# Patient Record
Sex: FEMALE | Race: WHITE
[De-identification: names, ages, dates, MRNs, and addresses within clinical notes are randomized per-mention and may not be internally consistent; named-entity substitution may affect disease eponyms.]

---

## 2017-01-24 ENCOUNTER — HOSPITAL ENCOUNTER (OUTPATIENT)
Dept: HOSPITAL 74 - FASU | Age: 67
Discharge: HOME | End: 2017-01-24
Attending: OPHTHALMOLOGY
Payer: COMMERCIAL

## 2017-01-24 VITALS — SYSTOLIC BLOOD PRESSURE: 137 MMHG | HEART RATE: 74 BPM | DIASTOLIC BLOOD PRESSURE: 82 MMHG

## 2017-01-24 VITALS — TEMPERATURE: 98.7 F

## 2017-01-24 VITALS — BODY MASS INDEX: 17.6 KG/M2

## 2017-01-24 DIAGNOSIS — H26.8: Primary | ICD-10-CM

## 2017-01-24 PROCEDURE — 08RJ3JZ REPLACEMENT OF RIGHT LENS WITH SYNTHETIC SUBSTITUTE, PERCUTANEOUS APPROACH: ICD-10-PCS | Performed by: OPHTHALMOLOGY

## 2017-01-24 RX ADMIN — FLURBIPROFEN SODIUM ONE DROP: 0.3 SOLUTION/ DROPS OPHTHALMIC at 09:50

## 2017-01-24 RX ADMIN — FLURBIPROFEN SODIUM ONE DROP: 0.3 SOLUTION/ DROPS OPHTHALMIC at 09:45

## 2017-01-24 RX ADMIN — GENTAMICIN SULFATE ONE DROP: 3 SOLUTION/ DROPS OPHTHALMIC at 09:40

## 2017-01-24 RX ADMIN — PHENYLEPHRINE HYDROCHLORIDE ONE DROP: 0.25 SPRAY NASAL at 09:50

## 2017-01-24 RX ADMIN — FLURBIPROFEN SODIUM ONE DROP: 0.3 SOLUTION/ DROPS OPHTHALMIC at 09:40

## 2017-01-24 RX ADMIN — CYCLOPENTOLATE HYDROCHLORIDE ONE DROP: 10 SOLUTION/ DROPS OPHTHALMIC at 09:50

## 2017-01-24 RX ADMIN — PHENYLEPHRINE HYDROCHLORIDE ONE DROP: 0.25 SPRAY NASAL at 09:55

## 2017-01-24 RX ADMIN — TROPICAMIDE ONE DROP: 10 SOLUTION/ DROPS OPHTHALMIC at 09:55

## 2017-01-24 RX ADMIN — FLURBIPROFEN SODIUM ONE DROP: 0.3 SOLUTION/ DROPS OPHTHALMIC at 10:00

## 2017-01-24 RX ADMIN — TROPICAMIDE ONE DROP: 10 SOLUTION/ DROPS OPHTHALMIC at 09:45

## 2017-01-24 RX ADMIN — GENTAMICIN SULFATE ONE DROP: 3 SOLUTION/ DROPS OPHTHALMIC at 09:50

## 2017-01-24 RX ADMIN — PHENYLEPHRINE HYDROCHLORIDE ONE DROP: 0.25 SPRAY NASAL at 09:40

## 2017-01-24 RX ADMIN — PHENYLEPHRINE HYDROCHLORIDE ONE DROP: 0.25 SPRAY NASAL at 09:45

## 2017-01-24 RX ADMIN — CYCLOPENTOLATE HYDROCHLORIDE ONE DROP: 10 SOLUTION/ DROPS OPHTHALMIC at 10:00

## 2017-01-24 RX ADMIN — TROPICAMIDE ONE DROP: 10 SOLUTION/ DROPS OPHTHALMIC at 09:40

## 2017-01-24 RX ADMIN — CYCLOPENTOLATE HYDROCHLORIDE ONE DROP: 10 SOLUTION/ DROPS OPHTHALMIC at 09:55

## 2017-01-24 RX ADMIN — GENTAMICIN SULFATE ONE DROP: 3 SOLUTION/ DROPS OPHTHALMIC at 09:45

## 2017-01-24 RX ADMIN — FLURBIPROFEN SODIUM ONE DROP: 0.3 SOLUTION/ DROPS OPHTHALMIC at 09:55

## 2017-01-24 RX ADMIN — CYCLOPENTOLATE HYDROCHLORIDE ONE DROP: 10 SOLUTION/ DROPS OPHTHALMIC at 09:45

## 2017-01-24 RX ADMIN — TROPICAMIDE ONE DROP: 10 SOLUTION/ DROPS OPHTHALMIC at 10:00

## 2017-01-24 RX ADMIN — CYCLOPENTOLATE HYDROCHLORIDE ONE DROP: 10 SOLUTION/ DROPS OPHTHALMIC at 09:40

## 2017-01-24 RX ADMIN — TROPICAMIDE ONE DROP: 10 SOLUTION/ DROPS OPHTHALMIC at 09:50

## 2017-01-24 RX ADMIN — PHENYLEPHRINE HYDROCHLORIDE ONE DROP: 0.25 SPRAY NASAL at 10:00

## 2017-01-24 RX ADMIN — GENTAMICIN SULFATE ONE DROP: 3 SOLUTION/ DROPS OPHTHALMIC at 09:55

## 2017-01-24 RX ADMIN — GENTAMICIN SULFATE ONE DROP: 3 SOLUTION/ DROPS OPHTHALMIC at 10:00

## 2017-01-25 NOTE — OP
DATE OF OPERATION:  01/24/2017

 

PREOPERATIVE DIAGNOSIS:   Cataract, right eye.

 

POSTOPERATIVE DIAGNOSIS:   Cataract, right eye.

 

PROCEDURE:   Cataract extraction via phacoemulsification with insertion of posterior

chamber lens implant, right eye. 

 

SURGEON:  Marcellus Dutton MD

 

ASSISTANT:  Luda Eldridge MD

 

ANESTHESIA:  Regional with sedation. 

 

COMPLICATIONS:  None. 

 

ESTIMATED BLOOD LOSS:  Less than 1 mL.

 

SPECIMENS:  None. 

 

PROCEDURE:   The patient was identified in the holding area.  After all risks,

benefits, and alternatives were explained to the patient, informed consent was

obtained.  The right eye was marked with a marking pen.  The patient entered the

operating room on a stretcher.  After a formal timeout was performed, a 3-mL

injection of equal parts of 2% lidocaine with epinephrine and 0.5% Marcaine was given

around the right eye.  The patient was then prepped and draped in the usual sterile

fashion.  A lid speculum was placed beneath the eyelids of the right eye.  A

superotemporal paracentesis incision was created using a 15-degree blade. 

Viscoelastic was then injected into the anterior chamber.  A 2.2-mm keratome blade

was then used to make an inferotemporal incision.  A 360-degree continuous

curvilinear capsulorrhexis was created using a bent cystotome and Utrata forceps. 

Hydrodissection was performed with balanced saline solution on a cannula. 

Phacoemulsification was then introduced to disassemble and remove the nucleus in its

entirety.  Irrigation/aspiration was then used to remove any remaining cortical

material from the eye.  The capsular bag was then refilled using viscoelastic.  Alejo

model [id number] with a power of 19.5 diopters, serial number [id number] was inspected

and found to be defect free and injected into the capsular bag. 

Irrigation/aspiration was then used to remove any remaining viscoelastic from the

eye.  The anterior chamber was then reformed using balanced saline solution. 

Intracaval injections of Miochol and Miostat were then given.  All wounds were

hydrated with balanced saline solution and noted to be water tight.  The anterior

chamber was deep.   The eye had an adequate pressure.  The intraocular lens was

perfectly centered within the capsular bag.  Topical antibiotic eyedrops and ointment

were instilled to the right eye.  The eyelid speculum was removed from the eye.  The

right eye was then patched and shielded.  The patient tolerated the procedure well

and left the operating room in stable condition to follow up in the eye clinic the

next morning at 9 o'clock. 

 

 

MARCELLUS DUTTON M.D.

 

[id number]

DD: 01/24/2017 11:33

DT: 01/25/2017 00:44

Job #:  [id number]

## 2017-04-25 ENCOUNTER — HOSPITAL ENCOUNTER (OUTPATIENT)
Dept: HOSPITAL 74 - FASU | Age: 67
Discharge: HOME | End: 2017-04-25
Attending: OPHTHALMOLOGY
Payer: COMMERCIAL

## 2017-04-25 VITALS — SYSTOLIC BLOOD PRESSURE: 136 MMHG | DIASTOLIC BLOOD PRESSURE: 80 MMHG | HEART RATE: 62 BPM

## 2017-04-25 VITALS — TEMPERATURE: 97.8 F

## 2017-04-25 VITALS — BODY MASS INDEX: 17.4 KG/M2

## 2017-04-25 DIAGNOSIS — H26.8: Primary | ICD-10-CM

## 2017-04-25 PROCEDURE — 08RK3JZ REPLACEMENT OF LEFT LENS WITH SYNTHETIC SUBSTITUTE, PERCUTANEOUS APPROACH: ICD-10-PCS | Performed by: OPHTHALMOLOGY

## 2017-04-25 RX ADMIN — GENTAMICIN SULFATE ONE DROP: 3 SOLUTION/ DROPS OPHTHALMIC at 07:20

## 2017-04-25 RX ADMIN — PHENYLEPHRINE HYDROCHLORIDE ONE DROP: 0.25 SPRAY NASAL at 07:20

## 2017-04-25 RX ADMIN — TROPICAMIDE ONE DROP: 10 SOLUTION/ DROPS OPHTHALMIC at 07:10

## 2017-04-25 RX ADMIN — GENTAMICIN SULFATE ONE DROP: 3 SOLUTION/ DROPS OPHTHALMIC at 07:30

## 2017-04-25 RX ADMIN — CYCLOPENTOLATE HYDROCHLORIDE ONE DROP: 10 SOLUTION/ DROPS OPHTHALMIC at 07:30

## 2017-04-25 RX ADMIN — CYCLOPENTOLATE HYDROCHLORIDE ONE DROP: 10 SOLUTION/ DROPS OPHTHALMIC at 07:20

## 2017-04-25 RX ADMIN — FLURBIPROFEN SODIUM ONE DROP: 0.3 SOLUTION/ DROPS OPHTHALMIC at 07:15

## 2017-04-25 RX ADMIN — TROPICAMIDE ONE DROP: 10 SOLUTION/ DROPS OPHTHALMIC at 07:25

## 2017-04-25 RX ADMIN — GENTAMICIN SULFATE ONE DROP: 3 SOLUTION/ DROPS OPHTHALMIC at 07:10

## 2017-04-25 RX ADMIN — PHENYLEPHRINE HYDROCHLORIDE ONE DROP: 0.25 SPRAY NASAL at 07:10

## 2017-04-25 RX ADMIN — FLURBIPROFEN SODIUM ONE DROP: 0.3 SOLUTION/ DROPS OPHTHALMIC at 07:10

## 2017-04-25 RX ADMIN — PHENYLEPHRINE HYDROCHLORIDE ONE DROP: 0.25 SPRAY NASAL at 07:15

## 2017-04-25 RX ADMIN — TROPICAMIDE ONE DROP: 10 SOLUTION/ DROPS OPHTHALMIC at 07:15

## 2017-04-25 RX ADMIN — CYCLOPENTOLATE HYDROCHLORIDE ONE DROP: 10 SOLUTION/ DROPS OPHTHALMIC at 07:15

## 2017-04-25 RX ADMIN — FLURBIPROFEN SODIUM ONE DROP: 0.3 SOLUTION/ DROPS OPHTHALMIC at 07:30

## 2017-04-25 RX ADMIN — CYCLOPENTOLATE HYDROCHLORIDE ONE DROP: 10 SOLUTION/ DROPS OPHTHALMIC at 07:10

## 2017-04-25 RX ADMIN — FLURBIPROFEN SODIUM ONE DROP: 0.3 SOLUTION/ DROPS OPHTHALMIC at 07:20

## 2017-04-25 RX ADMIN — PHENYLEPHRINE HYDROCHLORIDE ONE DROP: 0.25 SPRAY NASAL at 07:30

## 2017-04-25 RX ADMIN — FLURBIPROFEN SODIUM ONE DROP: 0.3 SOLUTION/ DROPS OPHTHALMIC at 07:25

## 2017-04-25 RX ADMIN — GENTAMICIN SULFATE ONE DROP: 3 SOLUTION/ DROPS OPHTHALMIC at 07:25

## 2017-04-25 RX ADMIN — PHENYLEPHRINE HYDROCHLORIDE ONE DROP: 0.25 SPRAY NASAL at 07:25

## 2017-04-25 RX ADMIN — CYCLOPENTOLATE HYDROCHLORIDE ONE DROP: 10 SOLUTION/ DROPS OPHTHALMIC at 07:25

## 2017-04-25 RX ADMIN — TROPICAMIDE ONE DROP: 10 SOLUTION/ DROPS OPHTHALMIC at 07:20

## 2017-04-25 RX ADMIN — GENTAMICIN SULFATE ONE DROP: 3 SOLUTION/ DROPS OPHTHALMIC at 07:15

## 2017-04-25 RX ADMIN — TROPICAMIDE ONE DROP: 10 SOLUTION/ DROPS OPHTHALMIC at 07:30

## 2017-04-25 NOTE — OP
DATE OF OPERATION:  04/25/2017

 

PREOPERATIVE DIAGNOSIS:  Cataract, left eye.

 

POSTOPERATIVE DIAGNOSIS:  Cataract, left eye.

 

PROCEDURE:  Cataract extraction via phacoemulsification with insertion of posterior

chamber lens implant, left eye.

 

SURGEON:  Marcellus Dutton MD 

 

ASSISTANT:  Luda Eldridge MD 

 

ESTIMATED BLOOD LOSS:  0 mL.

 

ANESTHESIA:  Regional with sedation.

 

COMPLICATIONS:  None.

 

SPECIMENS:  None.

 

DESCRIPTION OF PROCEDURE:  The patient was identified in the holding area.  After all

risks, benefits, and alternatives were explained to the patient, informed consent was

obtained.  The left eye was marked with a marking pen.  The patient entered the

operating room on an eye stretcher.  After formal time-out was performed, a 3 mL

injection of equal parts 2% lidocaine with epinephrine and 0.5% Marcaine was given

around the left eye.  The left eye was then prepped and draped in the usual sterile

fashion.  An eyelid speculum was placed beneath the eyelid of the left eye, and

infratemporal paracentesis incision was created using a15-degree blade.  Viscoelastic

was then injected into the anterior chamber.  A 2.4-mm keratome blade was then used

to make an supratemporal incision.  A 360-degree continuous curvilinear

capsulorrhexis was then created using pancystotome and Utrata forceps. 

Hydrodissection was performed with balanced saline solution on a cannula. 

Phacoemulsification was introduced to disassemble and remove the nucleus in its

entirety.  Irrigation/aspiration was then used to remove any remaining cortical

material from the eye.  The capsular bag was then refilled with viscoelastic.  An

Alejo Model [id number] with a power of 22.0 diopter serial number [id number] was

inspected and found to be defect free and injected into the capsular bag. 

Irrigation/aspiration was then used to remove any remaining viscoelastic from the

eye.  Intracameral injections of Miochol and Miostat were then given.  The pupil came

down and was round.  Balanced saline solution was then used to hydrate all corneal

wounds, and all wounds were found to be watertight.  The eye had adequate pressure. 

The anterior chamber was deep.  There was a red reflex present.  Topical antibiotic

eyedrops and ointment were then administered to the left eye.  The eyelid speculum

was removed from the left eye.  The left eye was patched and shielded.  The patient

tolerated the procedure well and left the operating room in stable condition to

follow up in the eye clinic tomorrow morning at 9 o'clock.

 

 

 

MARCELLUS DUTTON M.D.

 

DAVID/[id number]

DD: 04/25/2017 09:27

DT: 04/25/2017 13:59

Job #:  [id number]

## 2021-02-16 ENCOUNTER — APPOINTMENT (OUTPATIENT)
Dept: RHEUMATOLOGY | Facility: CLINIC | Age: 71
End: 2021-02-16
Payer: MEDICARE

## 2021-02-16 VITALS
DIASTOLIC BLOOD PRESSURE: 70 MMHG | SYSTOLIC BLOOD PRESSURE: 110 MMHG | WEIGHT: 110 LBS | HEIGHT: 66 IN | BODY MASS INDEX: 17.68 KG/M2

## 2021-02-16 DIAGNOSIS — Z86.79 PERSONAL HISTORY OF OTHER DISEASES OF THE CIRCULATORY SYSTEM: ICD-10-CM

## 2021-02-16 DIAGNOSIS — F17.200 NICOTINE DEPENDENCE, UNSPECIFIED, UNCOMPLICATED: ICD-10-CM

## 2021-02-16 DIAGNOSIS — Z82.49 FAMILY HISTORY OF ISCHEMIC HEART DISEASE AND OTHER DISEASES OF THE CIRCULATORY SYSTEM: ICD-10-CM

## 2021-02-16 DIAGNOSIS — Z72.89 OTHER PROBLEMS RELATED TO LIFESTYLE: ICD-10-CM

## 2021-02-16 PROBLEM — Z00.00 ENCOUNTER FOR PREVENTIVE HEALTH EXAMINATION: Status: ACTIVE | Noted: 2021-02-16

## 2021-02-16 PROCEDURE — 36415 COLL VENOUS BLD VENIPUNCTURE: CPT

## 2021-02-16 PROCEDURE — 99205 OFFICE O/P NEW HI 60 MIN: CPT | Mod: 25

## 2021-02-17 LAB
ALBUMIN SERPL ELPH-MCNC: 4.1 G/DL
ALP BLD-CCNC: 111 U/L
ALT SERPL-CCNC: 9 U/L
ANION GAP SERPL CALC-SCNC: 16 MMOL/L
AST SERPL-CCNC: 18 U/L
BASOPHILS # BLD AUTO: 0.04 K/UL
BASOPHILS NFR BLD AUTO: 0.5 %
BILIRUB SERPL-MCNC: 0.3 MG/DL
BUN SERPL-MCNC: 24 MG/DL
CALCIUM SERPL-MCNC: 10.1 MG/DL
CCP AB SER IA-ACNC: <8 UNITS
CHLORIDE SERPL-SCNC: 103 MMOL/L
CO2 SERPL-SCNC: 22 MMOL/L
CREAT SERPL-MCNC: 0.82 MG/DL
CRP SERPL-MCNC: 0.64 MG/DL
EOSINOPHIL # BLD AUTO: 0.14 K/UL
EOSINOPHIL NFR BLD AUTO: 1.8 %
ERYTHROCYTE [SEDIMENTATION RATE] IN BLOOD BY WESTERGREN METHOD: 18 MM/HR
GLUCOSE SERPL-MCNC: 91 MG/DL
HBV CORE IGG+IGM SER QL: NONREACTIVE
HBV SURFACE AB SER QL: NONREACTIVE
HBV SURFACE AG SER QL: NONREACTIVE
HCT VFR BLD CALC: 41.4 %
HCV AB SER QL: NONREACTIVE
HCV S/CO RATIO: 0.1 S/CO
HGB BLD-MCNC: 13.2 G/DL
IMM GRANULOCYTES NFR BLD AUTO: 0.3 %
LYMPHOCYTES # BLD AUTO: 1.54 K/UL
LYMPHOCYTES NFR BLD AUTO: 19.3 %
MAN DIFF?: NORMAL
MCHC RBC-ENTMCNC: 30.3 PG
MCHC RBC-ENTMCNC: 31.9 GM/DL
MCV RBC AUTO: 95 FL
MONOCYTES # BLD AUTO: 0.71 K/UL
MONOCYTES NFR BLD AUTO: 8.9 %
NEUTROPHILS # BLD AUTO: 5.55 K/UL
NEUTROPHILS NFR BLD AUTO: 69.2 %
PLATELET # BLD AUTO: 264 K/UL
POTASSIUM SERPL-SCNC: 4.2 MMOL/L
PROT SERPL-MCNC: 6.8 G/DL
RBC # BLD: 4.36 M/UL
RBC # FLD: 13.1 %
RF+CCP IGG SER-IMP: NEGATIVE
RHEUMATOID FACT SER QL: 11 IU/ML
SODIUM SERPL-SCNC: 141 MMOL/L
WBC # FLD AUTO: 8 K/UL

## 2021-02-17 NOTE — HISTORY OF PRESENT ILLNESS
[FreeTextEntry1] : 71 yo female referred by Dr. Wilde for further evaluation of arthritis.  \par She saw Dr. Wilde for wrist swelling and bumps on the top of her wrist.  He ordered an MRI, which was suspicious for inflammatory arthritis.\par \par She had surgery for De Quervain's on the left 5 yrs ago, and on her right wrist surgery for a fracture 10 years ago.\par She has no pain on the right wrist, but has developed fluid pockets.\par Pain and swelling in her left wrist started 1 year ago and is getting worse over time.  She takes Advil 2 tabs in am daily, with improvement.  No morning stiffness.  No pain in hands but she loses strength to open things.\par No other joint pains or swelling.  No rashes.\par No personal or family history of psoriasis.\par No oral ulcers.\par No dry eyes or mouth\par No Raynauds.\par No back pain.\par

## 2021-02-17 NOTE — ASSESSMENT
[FreeTextEntry1] : -will hold off on prednisone bc she has no pain\par -await results of labs to discuss proper DMARD's

## 2021-02-18 LAB — ANA SER IF-ACNC: NEGATIVE

## 2021-02-19 LAB
M TB IFN-G BLD-IMP: NEGATIVE
QUANTIFERON TB PLUS MITOGEN MINUS NIL: 9.29 IU/ML
QUANTIFERON TB PLUS NIL: 0.03 IU/ML
QUANTIFERON TB PLUS TB1 MINUS NIL: 0 IU/ML
QUANTIFERON TB PLUS TB2 MINUS NIL: 0 IU/ML

## 2021-02-23 LAB — HLA-B27 RELATED AG QL: NEGATIVE

## 2021-03-25 ENCOUNTER — APPOINTMENT (OUTPATIENT)
Dept: RHEUMATOLOGY | Facility: CLINIC | Age: 71
End: 2021-03-25
Payer: MEDICARE

## 2021-03-25 ENCOUNTER — NON-APPOINTMENT (OUTPATIENT)
Age: 71
End: 2021-03-25

## 2021-03-25 VITALS
BODY MASS INDEX: 17.68 KG/M2 | SYSTOLIC BLOOD PRESSURE: 112 MMHG | HEIGHT: 66 IN | DIASTOLIC BLOOD PRESSURE: 70 MMHG | WEIGHT: 110 LBS

## 2021-03-25 PROCEDURE — 99215 OFFICE O/P EST HI 40 MIN: CPT

## 2021-03-31 NOTE — HISTORY OF PRESENT ILLNESS
[FreeTextEntry1] : No joint pain.\par Her ankle is swollen but she got hit in the ankle at [employer] last week with a cart.\par No rashes.  Doesn't know if she has new nodules.\par \par She takes Advil twice a day "just because".  She notes it helps her be less stiff.\par \par She received both COVID vaccines.

## 2021-03-31 NOTE — ASSESSMENT
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  Even though she is seronegative, her physical exam is c/w classic RA findings and CRP is elevated, indicating active inflammation.  Maria M is hesitant to start treatment bc she has no pain.  We have discussed the risks associated with untreated RA, with increased risk of progression in her joints, which can cause erosions and permanent limitations in her ROM.  We also discussed that untreated RA increases her risks for extra-articular complications, the most worrisome in women being an increased risk for cardiovascular disease.\par We have discussed treatment  options, specifically Humira vs Simponi Aria vs MTX vs Xeljanz, and their associated risks/side effect profiles.  Will proceed with Humira (pen).  Will obtain auth.

## 2021-04-16 ENCOUNTER — APPOINTMENT (OUTPATIENT)
Dept: RHEUMATOLOGY | Facility: CLINIC | Age: 71
End: 2021-04-16

## 2021-04-16 VITALS
BODY MASS INDEX: 17.68 KG/M2 | WEIGHT: 110 LBS | HEIGHT: 66 IN | SYSTOLIC BLOOD PRESSURE: 110 MMHG | DIASTOLIC BLOOD PRESSURE: 70 MMHG

## 2021-04-29 ENCOUNTER — APPOINTMENT (OUTPATIENT)
Dept: RHEUMATOLOGY | Facility: CLINIC | Age: 71
End: 2021-04-29
Payer: MEDICARE

## 2021-04-29 VITALS
WEIGHT: 110 LBS | BODY MASS INDEX: 17.68 KG/M2 | DIASTOLIC BLOOD PRESSURE: 70 MMHG | HEIGHT: 66 IN | SYSTOLIC BLOOD PRESSURE: 120 MMHG

## 2021-04-29 PROCEDURE — 99212 OFFICE O/P EST SF 10 MIN: CPT | Mod: 25

## 2021-04-29 PROCEDURE — 96401 CHEMO ANTI-NEOPL SQ/IM: CPT

## 2021-04-29 RX ADMIN — ADALIMUMAB 0 MG/0.4ML: KIT at 00:00

## 2021-04-30 RX ORDER — ADALIMUMAB 40MG/0.4ML
40 KIT SUBCUTANEOUS
Qty: 0 | Refills: 0 | Status: COMPLETED | OUTPATIENT
Start: 2021-04-29

## 2021-05-03 NOTE — PROCEDURE
[FreeTextEntry1] : Area cleaned with alcohol.Humira 40mg/0.4mL injected-subcut-R thigh.Pt tolerated procedure well.

## 2021-05-03 NOTE — ASSESSMENT
[FreeTextEntry1] : Self injection technique reviewed with patient at length.  All questions answered.\par

## 2021-05-03 NOTE — HISTORY OF PRESENT ILLNESS
[FreeTextEntry1] : Here for injection training.\par \par She reports achiness and stiffness in her hands.  No new joints involved.

## 2021-07-27 ENCOUNTER — APPOINTMENT (OUTPATIENT)
Dept: RHEUMATOLOGY | Facility: CLINIC | Age: 71
End: 2021-07-27
Payer: MEDICARE

## 2021-07-27 VITALS
SYSTOLIC BLOOD PRESSURE: 116 MMHG | DIASTOLIC BLOOD PRESSURE: 70 MMHG | TEMPERATURE: 98 F | BODY MASS INDEX: 17.36 KG/M2 | RESPIRATION RATE: 16 BRPM | HEART RATE: 76 BPM | OXYGEN SATURATION: 97 % | WEIGHT: 108 LBS | HEIGHT: 66 IN

## 2021-07-27 PROCEDURE — 36415 COLL VENOUS BLD VENIPUNCTURE: CPT

## 2021-07-27 PROCEDURE — 99213 OFFICE O/P EST LOW 20 MIN: CPT | Mod: 25

## 2021-07-29 LAB
ALBUMIN SERPL ELPH-MCNC: 4.5 G/DL
ALP BLD-CCNC: 122 U/L
ALT SERPL-CCNC: 10 U/L
ANION GAP SERPL CALC-SCNC: 11 MMOL/L
AST SERPL-CCNC: 18 U/L
BASOPHILS # BLD AUTO: 0.04 K/UL
BASOPHILS NFR BLD AUTO: 0.6 %
BILIRUB SERPL-MCNC: 0.5 MG/DL
BUN SERPL-MCNC: 21 MG/DL
CALCIUM SERPL-MCNC: 9.6 MG/DL
CHLORIDE SERPL-SCNC: 103 MMOL/L
CO2 SERPL-SCNC: 27 MMOL/L
CREAT SERPL-MCNC: 0.79 MG/DL
CRP SERPL-MCNC: <3 MG/L
EOSINOPHIL # BLD AUTO: 0.17 K/UL
EOSINOPHIL NFR BLD AUTO: 2.4 %
ERYTHROCYTE [SEDIMENTATION RATE] IN BLOOD BY WESTERGREN METHOD: 8 MM/HR
GLUCOSE SERPL-MCNC: 87 MG/DL
HCT VFR BLD CALC: 39.8 %
HGB BLD-MCNC: 13.3 G/DL
IMM GRANULOCYTES NFR BLD AUTO: 0.3 %
LYMPHOCYTES # BLD AUTO: 2.33 K/UL
LYMPHOCYTES NFR BLD AUTO: 33.3 %
MAN DIFF?: NORMAL
MCHC RBC-ENTMCNC: 31.4 PG
MCHC RBC-ENTMCNC: 33.4 GM/DL
MCV RBC AUTO: 94.1 FL
MONOCYTES # BLD AUTO: 0.61 K/UL
MONOCYTES NFR BLD AUTO: 8.7 %
NEUTROPHILS # BLD AUTO: 3.82 K/UL
NEUTROPHILS NFR BLD AUTO: 54.7 %
PLATELET # BLD AUTO: 250 K/UL
POTASSIUM SERPL-SCNC: 4.2 MMOL/L
PROT SERPL-MCNC: 6.7 G/DL
RBC # BLD: 4.23 M/UL
RBC # FLD: 14.2 %
SODIUM SERPL-SCNC: 140 MMOL/L
WBC # FLD AUTO: 6.99 K/UL

## 2021-07-29 NOTE — ASSESSMENT
[FreeTextEntry1] : \par There has been complete resolution of synovitis of MCP's and MTP's and improved synovitis, decreased fluid filled cysts and improvement in ROM of wrists since initiating treatment with Humira.\par

## 2021-07-29 NOTE — HISTORY OF PRESENT ILLNESS
[FreeTextEntry1] : She continues injecting Humira.\par She feels more dry mouth and that her skin is more dry since starting Humira.\par No morning stiffness.\par She sometimes gets upset stomach in the morning when she wakes up.\par She has pain in her wrists and hands.

## 2021-09-13 ENCOUNTER — RESULT REVIEW (OUTPATIENT)
Age: 71
End: 2021-09-13

## 2021-09-17 ENCOUNTER — APPOINTMENT (OUTPATIENT)
Dept: RHEUMATOLOGY | Facility: CLINIC | Age: 71
End: 2021-09-17
Payer: MEDICARE

## 2021-09-17 VITALS
WEIGHT: 109 LBS | DIASTOLIC BLOOD PRESSURE: 64 MMHG | HEART RATE: 64 BPM | OXYGEN SATURATION: 97 % | SYSTOLIC BLOOD PRESSURE: 108 MMHG | BODY MASS INDEX: 17.52 KG/M2 | TEMPERATURE: 97.6 F | RESPIRATION RATE: 16 BRPM | HEIGHT: 66 IN

## 2021-09-17 DIAGNOSIS — M25.439 EFFUSION, UNSPECIFIED WRIST: ICD-10-CM

## 2021-09-17 PROCEDURE — 99214 OFFICE O/P EST MOD 30 MIN: CPT

## 2021-09-22 NOTE — ASSESSMENT
[FreeTextEntry1] : decreased markers of inflammation and decreased inflammation of wrists since starting Humira.  Will draw labs at next appointment.\par \par Hold HUmira while on Abx.\par \par .booster.

## 2021-09-22 NOTE — HISTORY OF PRESENT ILLNESS
[FreeTextEntry1] : She continues injecting Humira.\par \par She has a gum infection now and is on antibiotics so didn’t inject Humira last night.\par \par Pain in her hands has improved and she has improved movement.

## 2021-11-24 ENCOUNTER — APPOINTMENT (OUTPATIENT)
Dept: RHEUMATOLOGY | Facility: CLINIC | Age: 71
End: 2021-11-24
Payer: MEDICARE

## 2021-11-24 VITALS
WEIGHT: 106 LBS | OXYGEN SATURATION: 94 % | SYSTOLIC BLOOD PRESSURE: 124 MMHG | DIASTOLIC BLOOD PRESSURE: 66 MMHG | HEART RATE: 70 BPM | HEIGHT: 66 IN | TEMPERATURE: 98 F | BODY MASS INDEX: 17.04 KG/M2

## 2021-11-24 PROCEDURE — 99213 OFFICE O/P EST LOW 20 MIN: CPT

## 2021-11-27 NOTE — HISTORY OF PRESENT ILLNESS
[FreeTextEntry1] : She continues injecting Humira.  She held injection bc she had dental work.\par \par She will have dental extractions, implants and root canal.\par \par Pain in her hands has improved and she has improved movement.\par \par her hands are very dry

## 2021-11-27 NOTE — ASSESSMENT
[FreeTextEntry1] : decrease in markers of inflammation and swelling on Humira\par labs at next visit

## 2022-02-24 ENCOUNTER — APPOINTMENT (OUTPATIENT)
Dept: RHEUMATOLOGY | Facility: CLINIC | Age: 72
End: 2022-02-24

## 2022-03-07 ENCOUNTER — RX RENEWAL (OUTPATIENT)
Age: 72
End: 2022-03-07

## 2022-04-12 ENCOUNTER — APPOINTMENT (OUTPATIENT)
Dept: RHEUMATOLOGY | Facility: CLINIC | Age: 72
End: 2022-04-12
Payer: MEDICARE

## 2022-04-12 VITALS
DIASTOLIC BLOOD PRESSURE: 70 MMHG | SYSTOLIC BLOOD PRESSURE: 120 MMHG | BODY MASS INDEX: 16.55 KG/M2 | OXYGEN SATURATION: 96 % | HEIGHT: 66 IN | HEART RATE: 74 BPM | WEIGHT: 103 LBS

## 2022-04-12 PROCEDURE — 99213 OFFICE O/P EST LOW 20 MIN: CPT

## 2022-04-29 NOTE — HISTORY OF PRESENT ILLNESS
[FreeTextEntry1] : She continues injecting Humira.  She held injection bc she had dental work.\par \par She had permanent bridge and then the teeth that it was attached to got infected and she had to have all her top teeth pulled.  Then she had MRSA cellulitis on her hand.  She was admitted to [facility] for IV antibiotics and then her blood culture grew MRSA, so they had to switch antibiotics.  She did have a nasal swab for MRSA, which was negative.  \par \par She took Humira on April 5th and on the 6th she developed an ear infection and will finish antibiotics tomorrow.\par \par She missed Humira for most of the year.  Her knee is achy and her hands are achy.  stiffness improves with moving.\par \par No other rashes or nodules.

## 2022-06-13 ENCOUNTER — APPOINTMENT (OUTPATIENT)
Dept: RHEUMATOLOGY | Facility: CLINIC | Age: 72
End: 2022-06-13
Payer: MEDICARE

## 2022-06-13 VITALS
WEIGHT: 103 LBS | DIASTOLIC BLOOD PRESSURE: 60 MMHG | HEART RATE: 75 BPM | BODY MASS INDEX: 16.55 KG/M2 | HEIGHT: 66 IN | SYSTOLIC BLOOD PRESSURE: 116 MMHG | OXYGEN SATURATION: 96 %

## 2022-06-13 DIAGNOSIS — G25.81 RESTLESS LEGS SYNDROME: ICD-10-CM

## 2022-06-13 PROCEDURE — 99213 OFFICE O/P EST LOW 20 MIN: CPT | Mod: 25

## 2022-06-13 PROCEDURE — 36415 COLL VENOUS BLD VENIPUNCTURE: CPT

## 2022-06-14 LAB
ALBUMIN SERPL ELPH-MCNC: 4.2 G/DL
ALP BLD-CCNC: 89 U/L
ALT SERPL-CCNC: 9 U/L
ANION GAP SERPL CALC-SCNC: 12 MMOL/L
AST SERPL-CCNC: 22 U/L
BASOPHILS # BLD AUTO: 0.04 K/UL
BASOPHILS NFR BLD AUTO: 0.5 %
BILIRUB SERPL-MCNC: 0.3 MG/DL
BUN SERPL-MCNC: 18 MG/DL
CALCIUM SERPL-MCNC: 9.5 MG/DL
CHLORIDE SERPL-SCNC: 105 MMOL/L
CO2 SERPL-SCNC: 24 MMOL/L
CREAT SERPL-MCNC: 0.91 MG/DL
CRP SERPL-MCNC: <3 MG/L
EGFR: 67 ML/MIN/1.73M2
EOSINOPHIL # BLD AUTO: 0.14 K/UL
EOSINOPHIL NFR BLD AUTO: 1.9 %
ERYTHROCYTE [SEDIMENTATION RATE] IN BLOOD BY WESTERGREN METHOD: 21 MM/HR
FERRITIN SERPL-MCNC: 26 NG/ML
GLUCOSE SERPL-MCNC: 83 MG/DL
HCT VFR BLD CALC: 39.2 %
HGB BLD-MCNC: 12.6 G/DL
IMM GRANULOCYTES NFR BLD AUTO: 0.3 %
IRON SATN MFR SERPL: 28 %
IRON SERPL-MCNC: 102 UG/DL
LYMPHOCYTES # BLD AUTO: 2.71 K/UL
LYMPHOCYTES NFR BLD AUTO: 37 %
MAN DIFF?: NORMAL
MCHC RBC-ENTMCNC: 31.3 PG
MCHC RBC-ENTMCNC: 32.1 GM/DL
MCV RBC AUTO: 97.3 FL
MONOCYTES # BLD AUTO: 0.48 K/UL
MONOCYTES NFR BLD AUTO: 6.5 %
NEUTROPHILS # BLD AUTO: 3.94 K/UL
NEUTROPHILS NFR BLD AUTO: 53.8 %
PLATELET # BLD AUTO: 237 K/UL
POTASSIUM SERPL-SCNC: 5 MMOL/L
PROT SERPL-MCNC: 6.8 G/DL
RBC # BLD: 4.03 M/UL
RBC # FLD: 13.6 %
SODIUM SERPL-SCNC: 142 MMOL/L
TIBC SERPL-MCNC: 367 UG/DL
UIBC SERPL-MCNC: 265 UG/DL
VIT B12 SERPL-MCNC: <150 PG/ML
WBC # FLD AUTO: 7.33 K/UL

## 2022-06-23 NOTE — HISTORY OF PRESENT ILLNESS
[FreeTextEntry1] : She continues injecting Humira.  No issues.  \par SHe gets RLS in her legs when she sits too long.\par No change in joint pain in her hands/wrists.\par No rashes.  No new nodules.\par \par She received 4 COVID vaccines.

## 2022-09-20 ENCOUNTER — APPOINTMENT (OUTPATIENT)
Dept: RHEUMATOLOGY | Facility: CLINIC | Age: 72
End: 2022-09-20

## 2022-09-20 VITALS
WEIGHT: 103 LBS | OXYGEN SATURATION: 94 % | HEART RATE: 70 BPM | HEIGHT: 66 IN | SYSTOLIC BLOOD PRESSURE: 112 MMHG | DIASTOLIC BLOOD PRESSURE: 60 MMHG | BODY MASS INDEX: 16.55 KG/M2

## 2022-09-20 PROCEDURE — 99213 OFFICE O/P EST LOW 20 MIN: CPT

## 2022-09-26 NOTE — HISTORY OF PRESENT ILLNESS
[FreeTextEntry1] : She continues injecting Humira.  No issues.  \par \par She had an infection in her left second finger.  she went to the ER and had it drained by plastic surgery.  She also took Abx.  She held Humira during this time, and just resumed it.\par \par No joint pain.  Minimal joint stiffness.

## 2022-12-16 ENCOUNTER — APPOINTMENT (OUTPATIENT)
Dept: RHEUMATOLOGY | Facility: CLINIC | Age: 72
End: 2022-12-16
Payer: MEDICARE

## 2022-12-16 VITALS
HEIGHT: 66 IN | HEART RATE: 79 BPM | OXYGEN SATURATION: 97 % | WEIGHT: 103 LBS | SYSTOLIC BLOOD PRESSURE: 116 MMHG | BODY MASS INDEX: 16.55 KG/M2 | DIASTOLIC BLOOD PRESSURE: 68 MMHG

## 2022-12-16 PROCEDURE — 99213 OFFICE O/P EST LOW 20 MIN: CPT | Mod: 25

## 2022-12-16 PROCEDURE — 36415 COLL VENOUS BLD VENIPUNCTURE: CPT

## 2023-01-12 NOTE — HISTORY OF PRESENT ILLNESS
[FreeTextEntry1] : She continues injecting Humira.  She has achiness in the cold and rainy weather.\par \par Minimal joint stiffness.\par \par She was found to have an inguinal hernia that is wrapped around her appendix and needs surgery.

## 2023-03-13 ENCOUNTER — APPOINTMENT (OUTPATIENT)
Dept: RHEUMATOLOGY | Facility: CLINIC | Age: 73
End: 2023-03-13
Payer: MEDICARE

## 2023-03-13 VITALS
OXYGEN SATURATION: 97 % | HEART RATE: 71 BPM | WEIGHT: 103 LBS | BODY MASS INDEX: 16.55 KG/M2 | HEIGHT: 66 IN | SYSTOLIC BLOOD PRESSURE: 112 MMHG | DIASTOLIC BLOOD PRESSURE: 72 MMHG

## 2023-03-13 PROCEDURE — 36415 COLL VENOUS BLD VENIPUNCTURE: CPT

## 2023-03-13 PROCEDURE — 99214 OFFICE O/P EST MOD 30 MIN: CPT | Mod: 25

## 2023-03-14 LAB
ALBUMIN SERPL ELPH-MCNC: 4 G/DL
ALP BLD-CCNC: 89 U/L
ALT SERPL-CCNC: 13 U/L
ANION GAP SERPL CALC-SCNC: 11 MMOL/L
AST SERPL-CCNC: 32 U/L
BASOPHILS # BLD AUTO: 0.01 K/UL
BASOPHILS NFR BLD AUTO: 0.3 %
BILIRUB SERPL-MCNC: 0.2 MG/DL
BUN SERPL-MCNC: 17 MG/DL
CALCIUM SERPL-MCNC: 9.3 MG/DL
CHLORIDE SERPL-SCNC: 104 MMOL/L
CO2 SERPL-SCNC: 26 MMOL/L
CREAT SERPL-MCNC: 0.88 MG/DL
CRP SERPL-MCNC: <3 MG/L
EGFR: 70 ML/MIN/1.73M2
EOSINOPHIL # BLD AUTO: 0.1 K/UL
EOSINOPHIL NFR BLD AUTO: 2.6 %
ERYTHROCYTE [SEDIMENTATION RATE] IN BLOOD BY WESTERGREN METHOD: 26 MM/HR
GLUCOSE SERPL-MCNC: 80 MG/DL
HCT VFR BLD CALC: 39.1 %
HGB BLD-MCNC: 13.1 G/DL
IMM GRANULOCYTES NFR BLD AUTO: 0.3 %
LYMPHOCYTES # BLD AUTO: 1.72 K/UL
LYMPHOCYTES NFR BLD AUTO: 43.9 %
MAN DIFF?: NORMAL
MCHC RBC-ENTMCNC: 32 PG
MCHC RBC-ENTMCNC: 33.5 GM/DL
MCV RBC AUTO: 95.4 FL
MONOCYTES # BLD AUTO: 0.51 K/UL
MONOCYTES NFR BLD AUTO: 13 %
NEUTROPHILS # BLD AUTO: 1.57 K/UL
NEUTROPHILS NFR BLD AUTO: 39.9 %
PLATELET # BLD AUTO: 185 K/UL
POTASSIUM SERPL-SCNC: 4.1 MMOL/L
PROT SERPL-MCNC: 6.7 G/DL
RBC # BLD: 4.1 M/UL
RBC # FLD: 14 %
SODIUM SERPL-SCNC: 141 MMOL/L
WBC # FLD AUTO: 3.92 K/UL

## 2023-03-15 NOTE — HISTORY OF PRESENT ILLNESS
[FreeTextEntry1] : She continues injecting Humira. \par \par Minimal joint stiffness.\par \par 12/23 she had excruciating abdominal pain.  She went to the ER and had emergency surgery bc her inguinal hernia wrapped around her appendix.\par She held Humira for a month afternoon.  She also held it at least 2 weeks before the surgery bc she had an impending elective surgery date.\par Incision is healed.

## 2023-06-20 ENCOUNTER — APPOINTMENT (OUTPATIENT)
Dept: RHEUMATOLOGY | Facility: CLINIC | Age: 73
End: 2023-06-20
Payer: MEDICARE

## 2023-06-20 VITALS
WEIGHT: 102 LBS | HEIGHT: 66 IN | DIASTOLIC BLOOD PRESSURE: 70 MMHG | BODY MASS INDEX: 16.39 KG/M2 | HEART RATE: 77 BPM | SYSTOLIC BLOOD PRESSURE: 100 MMHG | OXYGEN SATURATION: 96 %

## 2023-06-20 PROCEDURE — 99214 OFFICE O/P EST MOD 30 MIN: CPT

## 2023-06-29 NOTE — HISTORY OF PRESENT ILLNESS
[FreeTextEntry1] : She continues injecting Humira. \par \par Minimal joint stiffness.\par \par She notes swelling in her ankles at the end of the day.  this has happened in previous years.

## 2023-09-20 ENCOUNTER — APPOINTMENT (OUTPATIENT)
Dept: RHEUMATOLOGY | Facility: CLINIC | Age: 73
End: 2023-09-20
Payer: MEDICARE

## 2023-09-20 VITALS
WEIGHT: 102 LBS | OXYGEN SATURATION: 95 % | RESPIRATION RATE: 14 BRPM | DIASTOLIC BLOOD PRESSURE: 70 MMHG | HEART RATE: 64 BPM | HEIGHT: 65 IN | SYSTOLIC BLOOD PRESSURE: 120 MMHG | BODY MASS INDEX: 17 KG/M2

## 2023-09-20 PROCEDURE — 36415 COLL VENOUS BLD VENIPUNCTURE: CPT

## 2023-09-20 PROCEDURE — 99214 OFFICE O/P EST MOD 30 MIN: CPT | Mod: 25

## 2023-11-06 LAB
ALBUMIN SERPL ELPH-MCNC: 4.4 G/DL
ALP BLD-CCNC: 84 U/L
ALT SERPL-CCNC: 11 U/L
ANION GAP SERPL CALC-SCNC: 12 MMOL/L
AST SERPL-CCNC: 21 U/L
BASOPHILS # BLD AUTO: 0.06 K/UL
BASOPHILS NFR BLD AUTO: 0.7 %
BILIRUB SERPL-MCNC: 0.4 MG/DL
BUN SERPL-MCNC: 20 MG/DL
CALCIUM SERPL-MCNC: 9.6 MG/DL
CHLORIDE SERPL-SCNC: 102 MMOL/L
CO2 SERPL-SCNC: 25 MMOL/L
CREAT SERPL-MCNC: 0.76 MG/DL
CRP SERPL-MCNC: <3 MG/L
EGFR: 83 ML/MIN/1.73M2
EOSINOPHIL # BLD AUTO: 0.17 K/UL
EOSINOPHIL NFR BLD AUTO: 2.1 %
ERYTHROCYTE [SEDIMENTATION RATE] IN BLOOD BY WESTERGREN METHOD: 21 MM/HR
GLUCOSE SERPL-MCNC: 89 MG/DL
HCT VFR BLD CALC: 42 %
HGB BLD-MCNC: 13.2 G/DL
IMM GRANULOCYTES NFR BLD AUTO: 0.2 %
LYMPHOCYTES # BLD AUTO: 2.45 K/UL
LYMPHOCYTES NFR BLD AUTO: 30.5 %
MAN DIFF?: NORMAL
MCHC RBC-ENTMCNC: 31.4 GM/DL
MCHC RBC-ENTMCNC: 31.4 PG
MCV RBC AUTO: 100 FL
MONOCYTES # BLD AUTO: 0.66 K/UL
MONOCYTES NFR BLD AUTO: 8.2 %
NEUTROPHILS # BLD AUTO: 4.67 K/UL
NEUTROPHILS NFR BLD AUTO: 58.3 %
PLATELET # BLD AUTO: 232 K/UL
POTASSIUM SERPL-SCNC: 4.5 MMOL/L
PROT SERPL-MCNC: 7.1 G/DL
RBC # BLD: 4.2 M/UL
RBC # FLD: 13.8 %
SODIUM SERPL-SCNC: 140 MMOL/L
WBC # FLD AUTO: 8.03 K/UL

## 2023-12-15 ENCOUNTER — APPOINTMENT (OUTPATIENT)
Dept: RHEUMATOLOGY | Facility: CLINIC | Age: 73
End: 2023-12-15
Payer: MEDICARE

## 2023-12-15 VITALS
DIASTOLIC BLOOD PRESSURE: 74 MMHG | SYSTOLIC BLOOD PRESSURE: 122 MMHG | HEIGHT: 65 IN | OXYGEN SATURATION: 96 % | WEIGHT: 102 LBS | BODY MASS INDEX: 17 KG/M2 | HEART RATE: 59 BPM

## 2023-12-15 DIAGNOSIS — M19.042 PRIMARY OSTEOARTHRITIS, RIGHT HAND: ICD-10-CM

## 2023-12-15 DIAGNOSIS — M71.22 SYNOVIAL CYST OF POPLITEAL SPACE [BAKER], LEFT KNEE: ICD-10-CM

## 2023-12-15 DIAGNOSIS — M06.9 RHEUMATOID ARTHRITIS, UNSPECIFIED: ICD-10-CM

## 2023-12-15 DIAGNOSIS — M19.041 PRIMARY OSTEOARTHRITIS, RIGHT HAND: ICD-10-CM

## 2023-12-15 PROCEDURE — 99214 OFFICE O/P EST MOD 30 MIN: CPT

## 2023-12-30 PROBLEM — M71.22 SYNOVIAL CYST OF LEFT KNEE: Status: ACTIVE | Noted: 2022-04-12

## 2023-12-30 PROBLEM — M06.9 RHEUMATOID ARTHRITIS: Status: ACTIVE | Noted: 2021-02-16

## 2023-12-30 PROBLEM — M19.041 OSTEOARTHRITIS OF HANDS, BILATERAL: Status: ACTIVE | Noted: 2021-07-27

## 2023-12-30 RX ORDER — ADALIMUMAB 40MG/0.4ML
40 KIT SUBCUTANEOUS
Qty: 6 | Refills: 3 | Status: ACTIVE | COMMUNITY
Start: 2021-03-31

## 2023-12-30 NOTE — ASSESSMENT
[FreeTextEntry1] : doing well on Humira  The patient's current disease and medications (humira) necessitate close follow up to assess for progression of the disease and laboratory testing to assess for drug toxicity and response to treatment. Failure to follow up in a timely manner can result in laboratory abnormalities, poorly controlled disease, medication side effects or infectious complications.

## 2023-12-30 NOTE — HISTORY OF PRESENT ILLNESS
[FreeTextEntry1] : She continues injecting Humira.   She had a URI, then developed PNA and took antibiotics, then developed an ear infection.  She is using drops.  Yesterday she was putting on a second shirt and pulled her back out on the left radiating down her butt.  Her dermatologist put her on Dupixent for eczema on her face.  None of the creams work.  Has pain in her left second finger and inability to close her hand.

## 2024-02-26 ENCOUNTER — APPOINTMENT (OUTPATIENT)
Dept: PAIN MANAGEMENT | Facility: CLINIC | Age: 74
End: 2024-02-26

## 2024-03-13 ENCOUNTER — APPOINTMENT (OUTPATIENT)
Dept: RHEUMATOLOGY | Facility: CLINIC | Age: 74
End: 2024-03-13